# Patient Record
Sex: FEMALE | Race: WHITE | Employment: OTHER | URBAN - METROPOLITAN AREA
[De-identification: names, ages, dates, MRNs, and addresses within clinical notes are randomized per-mention and may not be internally consistent; named-entity substitution may affect disease eponyms.]

---

## 2022-08-17 ENCOUNTER — TELEPHONE (OUTPATIENT)
Dept: ORTHOPEDICS CLINIC | Facility: CLINIC | Age: 77
End: 2022-08-17

## 2022-08-17 DIAGNOSIS — M79.672 LEFT FOOT PAIN: Primary | ICD-10-CM

## 2022-08-17 NOTE — TELEPHONE ENCOUNTER
Patient is coming in for Lt foot plantar facitis. Patient has not had any imaging done. Please place X-ray order accordingly. Patient has been notified to come in earlier prior to appointment. Thank you.     Future Appointments   Date Time Provider Mayi Galvez   8/18/2022 10:30 AM Sherice Bay DPM [facility]

## 2022-08-18 ENCOUNTER — OFFICE VISIT (OUTPATIENT)
Dept: ORTHOPEDICS CLINIC | Facility: CLINIC | Age: 77
End: 2022-08-18
Payer: MEDICARE

## 2022-08-18 ENCOUNTER — HOSPITAL ENCOUNTER (OUTPATIENT)
Dept: GENERAL RADIOLOGY | Age: 77
Discharge: HOME | End: 2022-08-18
Attending: PODIATRIST
Payer: MEDICARE

## 2022-08-18 VITALS — HEIGHT: 63 IN | BODY MASS INDEX: 25.34 KG/M2 | WEIGHT: 143 LBS

## 2022-08-18 DIAGNOSIS — M79.672 LEFT FOOT PAIN: ICD-10-CM

## 2022-08-18 DIAGNOSIS — M72.2 PLANTAR FASCIITIS: Primary | ICD-10-CM

## 2022-08-18 PROCEDURE — 20551 NJX 1 TENDON ORIGIN/INSJ: CPT | Performed by: PODIATRIST

## 2022-08-18 PROCEDURE — 73630 X-RAY EXAM OF FOOT: CPT | Performed by: PODIATRIST

## 2022-08-18 PROCEDURE — 99202 OFFICE O/P NEW SF 15 MIN: CPT | Performed by: PODIATRIST

## 2022-08-18 RX ORDER — BETAMETHASONE SODIUM PHOSPHATE AND BETAMETHASONE ACETATE 3; 3 MG/ML; MG/ML
4.2 INJECTION, SUSPENSION INTRA-ARTICULAR; INTRALESIONAL; INTRAMUSCULAR; SOFT TISSUE ONCE
Status: COMPLETED | OUTPATIENT
Start: 2022-08-18 | End: 2022-08-18

## 2022-08-18 RX ADMIN — BETAMETHASONE SODIUM PHOSPHATE AND BETAMETHASONE ACETATE 4.2 MG: 3; 3 INJECTION, SUSPENSION INTRA-ARTICULAR; INTRALESIONAL; INTRAMUSCULAR; SOFT TISSUE at 11:40:00

## 2022-09-01 ENCOUNTER — OFFICE VISIT (OUTPATIENT)
Dept: ORTHOPEDICS CLINIC | Facility: CLINIC | Age: 77
End: 2022-09-01
Payer: MEDICARE

## 2022-09-01 VITALS — BODY MASS INDEX: 25.34 KG/M2 | WEIGHT: 143 LBS | HEIGHT: 63 IN

## 2022-09-01 DIAGNOSIS — M79.672 LEFT FOOT PAIN: ICD-10-CM

## 2022-09-01 DIAGNOSIS — S86.912S: ICD-10-CM

## 2022-09-01 DIAGNOSIS — M72.2 PLANTAR FASCIITIS: Primary | ICD-10-CM

## 2022-09-01 PROCEDURE — 99213 OFFICE O/P EST LOW 20 MIN: CPT | Performed by: PODIATRIST

## 2022-09-01 RX ORDER — BETAMETHASONE SODIUM PHOSPHATE AND BETAMETHASONE ACETATE 3; 3 MG/ML; MG/ML
4.2 INJECTION, SUSPENSION INTRA-ARTICULAR; INTRALESIONAL; INTRAMUSCULAR; SOFT TISSUE ONCE
Status: DISCONTINUED | OUTPATIENT
Start: 2022-09-01 | End: 2022-09-01

## 2022-09-01 NOTE — PROGRESS NOTES
[facility] Progress Note    Subjective:     Patient returns for follow-up of Planter fasciitis. The first injection was helpful and she feels that she did a lot of walking but still is having some discomfort on the outside of the foot. She has been through some physical therapy and they are addressing the outside of the leg in general leg tenderness        Objective:     Exam less tenderness about the plantar heel today left foot but along the peroneal tendon course and proximal muscle belly. No swelling no warmth                  Assessment/Plan:     Diagnoses and all orders for this visit:    Plantar fasciitis  -     Cancel: INJECTION; TENDON ORIGIN/INSERTION  -     Discontinue: betamethasone sodium phosphate & acetate (Celestone) 6 (3-3) MG/ML injection 4.2 mg    Left foot pain  -     Cancel: INJECTION; TENDON ORIGIN/INSERTION  -     Discontinue: betamethasone sodium phosphate & acetate (Celestone) 6 (3-3) MG/ML injection 4.2 mg    Strain of tendon of left lower extremity, sequela        Hold off on the injection today as she seems to be doing better and she may want to go ahead with the second injection prior to a very active trip she has in about 3 weeks. Continue with physical therapy. The peroneal tendon issue is probably compensatory to the heel pain            Ulysses Sea. Mayito Aragon DPM  [facility]    [employer] speech recognition software was used to prepare this note. If a word or phrase is confusing, it is likely do to a failure of recognition. Please contact me with any questions or clarifications.

## 2022-09-22 ENCOUNTER — OFFICE VISIT (OUTPATIENT)
Dept: ORTHOPEDICS CLINIC | Facility: CLINIC | Age: 77
End: 2022-09-22

## 2022-09-22 VITALS — HEIGHT: 63 IN | WEIGHT: 143 LBS | BODY MASS INDEX: 25.34 KG/M2

## 2022-09-22 DIAGNOSIS — S86.912S: ICD-10-CM

## 2022-09-22 DIAGNOSIS — M72.2 PLANTAR FASCIITIS: Primary | ICD-10-CM

## 2022-09-22 DIAGNOSIS — M79.672 LEFT FOOT PAIN: ICD-10-CM

## 2022-09-22 DIAGNOSIS — M77.52 LEFT CALCANEAL BURSITIS: ICD-10-CM

## 2022-09-22 PROCEDURE — 20551 NJX 1 TENDON ORIGIN/INSJ: CPT | Performed by: PODIATRIST

## 2022-09-22 PROCEDURE — 99213 OFFICE O/P EST LOW 20 MIN: CPT | Performed by: PODIATRIST

## 2022-09-22 RX ORDER — BETAMETHASONE SODIUM PHOSPHATE AND BETAMETHASONE ACETATE 3; 3 MG/ML; MG/ML
4.2 INJECTION, SUSPENSION INTRA-ARTICULAR; INTRALESIONAL; INTRAMUSCULAR; SOFT TISSUE ONCE
Status: COMPLETED | OUTPATIENT
Start: 2022-09-22 | End: 2022-09-22

## 2022-09-22 RX ADMIN — BETAMETHASONE SODIUM PHOSPHATE AND BETAMETHASONE ACETATE 4.2 MG: 3; 3 INJECTION, SUSPENSION INTRA-ARTICULAR; INTRALESIONAL; INTRAMUSCULAR; SOFT TISSUE at 14:35:00

## 2022-09-22 NOTE — PROGRESS NOTES
[facility] Progress Note    Subjective:   Mrs Lulu Kraus is here for follow-up. She has had 1 injection and would like to proceed with the second as she is going [place] a trip [place] soon and has a lot of walking to do in the airport and on the trip. She did get relief with the first injection but it has been over a month. She has been wearing good shoes and did get some Dr. Leonard Certain inserts. She still is having some lateral foot pain compensation type pain of that left foot. Objective:     She describes sort of an achy sensation along the course of the peroneal tendon although no tenderness right now. No proximal muscle tenderness. Tender directly plantar aspect of the heel with palpable bursa and thinning fat pad                  Assessment/Plan:     Diagnoses and all orders for this visit:    Plantar fasciitis  -     betamethasone sodium phosphate & acetate (Celestone) 6 (3-3) MG/ML injection 4.2 mg  -     INJECTION; TENDON ORIGIN/INSERTION    Left foot pain  -     betamethasone sodium phosphate & acetate (Celestone) 6 (3-3) MG/ML injection 4.2 mg  -     INJECTION; TENDON ORIGIN/INSERTION    Left calcaneal bursitis    Strain of tendon of left lower extremity, sequela        I think she would benefit from a heel cup with a cut out in the center for the bursitis. Continue with good shoes and I would anticipate the compensation pain improves as the heel pain improves. She has a little peroneal tendon strain. We did proceed with the second of possibly 3 injections today and she tolerated it well. Icing and rest of the foot today. Follow-up when she gets back in town prn    Risks and benefits discussed. Sterile prep of affected heel. Injection of .7cc of betamethasone phosphate to painful area of left heel into bursae. Alena Brand. Jemma Mederos, CORNELL  [facility]    [employer] speech recognition software was used to prepare this note.  If a word or phrase is confusing, it is likely do to a failure of recognition. Please contact me with any questions or clarifications.